# Patient Record
Sex: FEMALE | URBAN - METROPOLITAN AREA
[De-identification: names, ages, dates, MRNs, and addresses within clinical notes are randomized per-mention and may not be internally consistent; named-entity substitution may affect disease eponyms.]

---

## 2017-12-01 ENCOUNTER — OUTPATIENT (OUTPATIENT)
Dept: OUTPATIENT SERVICES | Facility: HOSPITAL | Age: 62
LOS: 1 days | End: 2017-12-01
Payer: MEDICAID

## 2017-12-12 DIAGNOSIS — R69 ILLNESS, UNSPECIFIED: ICD-10-CM

## 2018-04-01 PROCEDURE — G9001: CPT

## 2018-07-01 ENCOUNTER — OUTPATIENT (OUTPATIENT)
Dept: OUTPATIENT SERVICES | Facility: HOSPITAL | Age: 63
LOS: 1 days | End: 2018-07-01
Payer: MEDICAID

## 2018-07-01 PROCEDURE — G9005: CPT

## 2018-07-11 DIAGNOSIS — Z71.89 OTHER SPECIFIED COUNSELING: ICD-10-CM

## 2018-10-09 ENCOUNTER — APPOINTMENT (OUTPATIENT)
Dept: OPHTHALMOLOGY | Facility: CLINIC | Age: 63
End: 2018-10-09
Payer: MEDICAID

## 2018-10-09 PROCEDURE — 99203 OFFICE O/P NEW LOW 30 MIN: CPT

## 2018-10-18 ENCOUNTER — APPOINTMENT (OUTPATIENT)
Dept: OPHTHALMOLOGY | Facility: CLINIC | Age: 63
End: 2018-10-18
Payer: MEDICAID

## 2018-10-18 PROCEDURE — 92014 COMPRE OPH EXAM EST PT 1/>: CPT

## 2018-10-18 PROCEDURE — 92136 OPHTHALMIC BIOMETRY: CPT

## 2018-11-16 ENCOUNTER — APPOINTMENT (OUTPATIENT)
Dept: OPHTHALMOLOGY | Facility: EYE CENTER | Age: 63
End: 2018-11-16
Payer: MEDICAID

## 2018-11-16 PROCEDURE — 66984 XCAPSL CTRC RMVL W/O ECP: CPT | Mod: LT

## 2018-11-17 ENCOUNTER — APPOINTMENT (OUTPATIENT)
Dept: OPHTHALMOLOGY | Facility: CLINIC | Age: 63
End: 2018-11-17
Payer: MEDICAID

## 2018-11-17 PROCEDURE — 99024 POSTOP FOLLOW-UP VISIT: CPT

## 2018-12-03 ENCOUNTER — APPOINTMENT (OUTPATIENT)
Dept: OPHTHALMOLOGY | Facility: CLINIC | Age: 63
End: 2018-12-03

## 2018-12-06 ENCOUNTER — APPOINTMENT (OUTPATIENT)
Dept: OPHTHALMOLOGY | Facility: CLINIC | Age: 63
End: 2018-12-06
Payer: MEDICAID

## 2018-12-06 PROCEDURE — 99024 POSTOP FOLLOW-UP VISIT: CPT

## 2018-12-20 ENCOUNTER — APPOINTMENT (OUTPATIENT)
Dept: OPHTHALMOLOGY | Facility: CLINIC | Age: 63
End: 2018-12-20
Payer: MEDICAID

## 2018-12-20 PROCEDURE — 99024 POSTOP FOLLOW-UP VISIT: CPT

## 2019-01-04 ENCOUNTER — APPOINTMENT (OUTPATIENT)
Dept: OPHTHALMOLOGY | Facility: EYE CENTER | Age: 64
End: 2019-01-04
Payer: MEDICAID

## 2019-01-04 PROCEDURE — 66984 XCAPSL CTRC RMVL W/O ECP: CPT | Mod: RT,79

## 2019-01-05 ENCOUNTER — APPOINTMENT (OUTPATIENT)
Dept: OPHTHALMOLOGY | Facility: CLINIC | Age: 64
End: 2019-01-05

## 2019-01-08 ENCOUNTER — APPOINTMENT (OUTPATIENT)
Dept: OPHTHALMOLOGY | Facility: CLINIC | Age: 64
End: 2019-01-08
Payer: MEDICAID

## 2019-01-08 PROCEDURE — 99024 POSTOP FOLLOW-UP VISIT: CPT

## 2019-01-09 ENCOUNTER — APPOINTMENT (OUTPATIENT)
Dept: OPHTHALMOLOGY | Facility: CLINIC | Age: 64
End: 2019-01-09
Payer: MEDICAID

## 2019-01-09 PROCEDURE — 99024 POSTOP FOLLOW-UP VISIT: CPT

## 2019-01-10 ENCOUNTER — APPOINTMENT (OUTPATIENT)
Dept: OPHTHALMOLOGY | Facility: CLINIC | Age: 64
End: 2019-01-10
Payer: MEDICAID

## 2019-01-10 ENCOUNTER — TRANSCRIPTION ENCOUNTER (OUTPATIENT)
Age: 64
End: 2019-01-10

## 2019-01-10 PROCEDURE — 99024 POSTOP FOLLOW-UP VISIT: CPT

## 2019-01-11 ENCOUNTER — OUTPATIENT (OUTPATIENT)
Dept: OUTPATIENT SERVICES | Facility: HOSPITAL | Age: 64
LOS: 1 days | End: 2019-01-11
Payer: MEDICAID

## 2019-01-11 VITALS
RESPIRATION RATE: 14 BRPM | HEIGHT: 59.5 IN | OXYGEN SATURATION: 100 % | DIASTOLIC BLOOD PRESSURE: 74 MMHG | SYSTOLIC BLOOD PRESSURE: 112 MMHG | HEART RATE: 57 BPM | WEIGHT: 126.99 LBS | TEMPERATURE: 98 F

## 2019-01-11 VITALS
HEART RATE: 49 BPM | RESPIRATION RATE: 13 BRPM | OXYGEN SATURATION: 100 % | SYSTOLIC BLOOD PRESSURE: 114 MMHG | DIASTOLIC BLOOD PRESSURE: 57 MMHG

## 2019-01-11 DIAGNOSIS — H59.021 CATARACT (LENS) FRAGMENTS IN EYE FOLLOWING CATARACT SURGERY, RIGHT EYE: ICD-10-CM

## 2019-01-11 DIAGNOSIS — S89.90XA UNSPECIFIED INJURY OF UNSPECIFIED LOWER LEG, INITIAL ENCOUNTER: Chronic | ICD-10-CM

## 2019-01-11 PROCEDURE — 66852 REMOVAL OF LENS MATERIAL: CPT | Mod: RT

## 2019-01-17 ENCOUNTER — APPOINTMENT (OUTPATIENT)
Dept: OPHTHALMOLOGY | Facility: CLINIC | Age: 64
End: 2019-01-17
Payer: MEDICAID

## 2019-01-17 PROCEDURE — 99024 POSTOP FOLLOW-UP VISIT: CPT

## 2019-01-31 ENCOUNTER — APPOINTMENT (OUTPATIENT)
Dept: OPHTHALMOLOGY | Facility: CLINIC | Age: 64
End: 2019-01-31

## 2019-02-05 PROBLEM — J45.909 UNSPECIFIED ASTHMA, UNCOMPLICATED: Chronic | Status: ACTIVE | Noted: 2019-01-11

## 2019-02-05 PROBLEM — F32.9 MAJOR DEPRESSIVE DISORDER, SINGLE EPISODE, UNSPECIFIED: Chronic | Status: ACTIVE | Noted: 2019-01-11

## 2019-02-07 ENCOUNTER — APPOINTMENT (OUTPATIENT)
Dept: OPHTHALMOLOGY | Facility: CLINIC | Age: 64
End: 2019-02-07
Payer: MEDICAID

## 2019-02-07 PROCEDURE — 99024 POSTOP FOLLOW-UP VISIT: CPT

## 2019-03-07 ENCOUNTER — APPOINTMENT (OUTPATIENT)
Dept: OPHTHALMOLOGY | Facility: CLINIC | Age: 64
End: 2019-03-07

## 2019-04-09 ENCOUNTER — APPOINTMENT (OUTPATIENT)
Dept: OPHTHALMOLOGY | Facility: CLINIC | Age: 64
End: 2019-04-09
Payer: MEDICAID

## 2019-04-09 PROCEDURE — 92012 INTRM OPH EXAM EST PATIENT: CPT

## 2019-08-08 ENCOUNTER — APPOINTMENT (OUTPATIENT)
Dept: OPHTHALMOLOGY | Facility: CLINIC | Age: 64
End: 2019-08-08

## 2020-10-07 ENCOUNTER — RESULT REVIEW (OUTPATIENT)
Age: 65
End: 2020-10-07

## 2021-01-29 NOTE — ASU PREOP CHECKLIST - SPO2 (%)
Telephone call spoke with pt  Per Dr Gail Aponte continue taking the lasix and potassium as rx  Original rxs have 1 refill  Pt requested I call the pharmacy stating they do not always see the refills  Telephone call to [facility] pharmacy spoke with clayton and he said yes she has 1 refill on each   100

## 2022-08-09 ENCOUNTER — OFFICE (OUTPATIENT)
Dept: URBAN - METROPOLITAN AREA CLINIC 90 | Facility: CLINIC | Age: 67
Setting detail: OPHTHALMOLOGY
End: 2022-08-09
Payer: MEDICAID

## 2022-08-09 DIAGNOSIS — H26.492: ICD-10-CM

## 2022-08-09 PROCEDURE — 99213 OFFICE O/P EST LOW 20 MIN: CPT | Performed by: OPHTHALMOLOGY

## 2022-08-09 ASSESSMENT — CONFRONTATIONAL VISUAL FIELD TEST (CVF)
OS_FINDINGS: FULL
OD_FINDINGS: FULL

## 2022-08-09 ASSESSMENT — TONOMETRY
OS_IOP_MMHG: 18
OD_IOP_MMHG: 18

## 2022-08-10 ASSESSMENT — VISUAL ACUITY
OS_BCVA: 20/20
OD_BCVA: 20/40

## 2022-08-10 ASSESSMENT — KERATOMETRY
OS_AXISANGLE_DEGREES: 018
OD_AXISANGLE_DEGREES: 083
OS_K1POWER_DIOPTERS: 46.25
OD_K2POWER_DIOPTERS: 46.25
OS_K2POWER_DIOPTERS: 46.75
OD_K1POWER_DIOPTERS: 46.00

## 2022-08-10 ASSESSMENT — REFRACTION_CURRENTRX
OD_VPRISM_DIRECTION: SV
OD_CYLINDER: 0.00
OD_SPHERE: +1.75
OS_CYLINDER: -0.75
OS_OVR_VA: 20/
OS_SPHERE: +1.75
OS_AXIS: 091
OD_AXIS: 180
OS_VPRISM_DIRECTION: SV
OD_OVR_VA: 20/

## 2022-08-10 ASSESSMENT — SPHEQUIV_DERIVED
OS_SPHEQUIV: -0.5
OD_SPHEQUIV: 0

## 2022-08-10 ASSESSMENT — REFRACTION_AUTOREFRACTION
OS_CYLINDER: -1.00
OD_SPHERE: +0.25
OD_AXIS: 085
OS_SPHERE: 0.00
OS_AXIS: 089
OD_CYLINDER: -0.50

## 2022-08-10 ASSESSMENT — AXIALLENGTH_DERIVED
OS_AL: 22.7184
OD_AL: 22.6656